# Patient Record
(demographics unavailable — no encounter records)

---

## 2025-03-20 NOTE — LETTER BODY
[Dear  ___] : Dear  [unfilled], [Courtesy Letter:] : I had the pleasure of seeing your patient, [unfilled], in my office today. [Please see my note below.] : Please see my note below. [Consult Closing:] : Thank you very much for allowing me to participate in the care of this patient.  If you have any questions, please do not hesitate to contact me. [FreeTextEntry3] : Best Regards,   Jess Mattson MD

## 2025-03-20 NOTE — HISTORY OF PRESENT ILLNESS
[FreeTextEntry1] : 48 YO M seen 4/5/2024 as NPT for kidney stones. He had his first and only episode of renal colic a few weeks ago with left renal colic. Went to [facility] ER and had a CT scan confirming stones. He subsequently passed 2 stones by history, did not capture them, and has been pain free. He is here for further evaluation.  Patient has MS and is on monthly infusions. NO other medicines and NKMA.  UA negative IPSS 8 MARCO ANTONIO 7 AGA 25 Renal US: Right kidney: 10.6 cm x 4.4 cm x 4.8 cm Cortical Thickness: 1.6 cm UP 1.3 cm LP Left kidney: 10.5 cm x 5.2 cm x 4.9 cm Cortical Thickness: 1.8 cm UP 1.3 cm LP Echogenicity: Normal Bladder: Not scanned Impressions: Right Kidney-There are 3 echogenic foci in the right mid to lower pole measuring 3.5 mm, 3.2 mm and 4.4 mm, all with faint shadowing and no twinkle artifact. Left Kidney-The left kidney is unremarkable. Both kidneys are normal in size and echogenicity without hydronephrosis or solid masses present. CT scan from ER 2mm left UVJ stone with hydroureteronephrosis, 3 mm right renal stone not obstructing. BUN18. CRE 1.36 in ER I reviewed the results of the CT scan blood work from the ER and ultrasound from our office today with the patient in detail we identified the resolution of his hydroureteronephrosis found on CT scan, by ultrasound today. We also confirmed at least 1 stone in the right kidney measuring between 3 and 4-1/2 mm by CT and ultrasound evaluation respectively. This is asymptomatic and non-obstructing at the present time. I recommended that we proceed with repeat BMP which was drawn today and KUB x-ray which he will have. We will meet after that to determine what and if the stone on the in the right kidney requires. In the meanwhile, I encouraged him to increase his H2O intake.  BMP: BUN 17, DIGNA 1,21 KUB X-ray 411/2024: Findings/ impression: Each kidney is obscured partially. Right kidney 3 mm calculus. No abnormal bowel dilatation or pneumoperitoneum. Levoscoliosis. Colonic and rectal feces/impaction.  Patient seen 4/12/2024 to review X-ray report and plan next steps. KUB X-ray from 4/11/2024 reviewed in PACS, 3 mm stone seen in the right lower pole, significant stool and gas in the colon, obscuring part of the right kidney.  UA - unable to leave a specimen today. We discussed findings on KUB x-ray today in comparison to his recent renal ultrasound performed in my office last week. These findings are most likely due to a small 3 mm right lower pole stone which is nonobstructing. I discussed with the patient the need to increase his H2O intake in order to prevent growth of the stone and we discussed some maneuvers to aid in the passage of the stone. We will reassess in 3 months with a repeat renal ultrasound in the office. As long as he remains pain-free and the stone does not grow, we will continue to observe.   Patient cancelled 7/11/2024 to reassess with Renal US. Staff to reschedule.   2/26/2025 Patient seen in the ER on 2/25/2025. Had a CT scan which showed a 3 mm UVJ stone. Phoned patient, he is pain free, but has not captured any stones. Instructed to keep looking and my staff will schedule FU in 2 weeks or so for Renal US and FU.  Patient seen TODAY 3/20/2025 to reassess with renal US. He has not had renal colic since the ER visit, did not capture any stones.  He continues to drink a significant amount of water. UA negative IPSS 10 RENAL US: Right kidney: 9.6 x 5.1 x 5.3 cm Cortical Thickness: 1.2 cm UP and 1.1 cm LP Left kidney: 10.1 x 6.3 x 4.6 cm Cortical Thickness: 1.4 cm UP and 1.4 cm LP Echogenicity: Normal Findings: Right Kidney: There is an echogenic focus in the mid to lower pole measuring 5.1 mm with twinkle artifact. There is a nonvascular sub centimeter cyst in the mid pole measuring 7.1 x 7.0 x 6.5 mm. Left Kidney: Appears unremarkable. Both kidneys are normal in size and echogenicity without hydronephrosis or solid masses present.  PELVIC US: Bladder: There is a nonmobile echogenic focus in the left UVJ measuring 7.1 mm with twinkle artifact. PVR: 15.3 cc Prostate 26gm

## 2025-03-20 NOTE — PHYSICAL EXAM
[General Appearance - In No Acute Distress] : no acute distress [Costovertebral Angle Tenderness] : no ~M costovertebral angle tenderness [Oriented To Time, Place, And Person] : oriented to person, place, and time [Affect] : the affect was normal [Mood] : the mood was normal [Not Anxious] : not anxious

## 2025-03-20 NOTE — ASSESSMENT
[FreeTextEntry1] : Findings today are consistent with a persistent left ureterovesical junction stone not presently obstructing.  I reviewed these findings with the patient today contrasting the CT scan views from the ER dated 2/25/2025 with the ultrasound views from the ultrasound of today.  Of the several findings noted today including the right renal stone and the cyst on the right kidney, these are not acute at this time.  We discussed the persistent left you the J stone as having the potential of up obstructing the left kidney causing either renal colic as he had on 25 February or causing silent obstruction with subsequent damage to the kidney over time.  I recommended he consider either close follow-up with interval renal ultrasound and pelvic ultrasound in 6 weeks or evaluation for definitive treatment of the left UVJ stone now.  We discussed the indications risks alternatives and chances for success with each of these approaches and he would like to proceed with evaluation and/or treatment as appropriate of the stone now.  I agree with this decision.  To this end, appointment was made to see my partner Dr. Arturo Yin for further assessment and treatment of this patient's stone disease. Jess Mattson MD

## 2025-03-20 NOTE — HISTORY OF PRESENT ILLNESS
[FreeTextEntry1] : 50 YO M seen 4/5/2024 as NPT for kidney stones. He had his first and only episode of renal colic a few weeks ago with left renal colic. Went to [facility] ER and had a CT scan confirming stones. He subsequently passed 2 stones by history, did not capture them, and has been pain free. He is here for further evaluation.  Patient has MS and is on monthly infusions. NO other medicines and NKMA.  UA negative IPSS 8 MARCO ANTONIO 7 AGA 25 Renal US: Right kidney: 10.6 cm x 4.4 cm x 4.8 cm Cortical Thickness: 1.6 cm UP 1.3 cm LP Left kidney: 10.5 cm x 5.2 cm x 4.9 cm Cortical Thickness: 1.8 cm UP 1.3 cm LP Echogenicity: Normal Bladder: Not scanned Impressions: Right Kidney-There are 3 echogenic foci in the right mid to lower pole measuring 3.5 mm, 3.2 mm and 4.4 mm, all with faint shadowing and no twinkle artifact. Left Kidney-The left kidney is unremarkable. Both kidneys are normal in size and echogenicity without hydronephrosis or solid masses present. CT scan from ER 2mm left UVJ stone with hydroureteronephrosis, 3 mm right renal stone not obstructing. BUN18. CRE 1.36 in ER I reviewed the results of the CT scan blood work from the ER and ultrasound from our office today with the patient in detail we identified the resolution of his hydroureteronephrosis found on CT scan, by ultrasound today. We also confirmed at least 1 stone in the right kidney measuring between 3 and 4-1/2 mm by CT and ultrasound evaluation respectively. This is asymptomatic and non-obstructing at the present time. I recommended that we proceed with repeat BMP which was drawn today and KUB x-ray which he will have. We will meet after that to determine what and if the stone on the in the right kidney requires. In the meanwhile, I encouraged him to increase his H2O intake.  BMP: BUN 17, DIGNA 1,21 KUB X-ray 411/2024: Findings/ impression: Each kidney is obscured partially. Right kidney 3 mm calculus. No abnormal bowel dilatation or pneumoperitoneum. Levoscoliosis. Colonic and rectal feces/impaction.  Patient seen 4/12/2024 to review X-ray report and plan next steps. KUB X-ray from 4/11/2024 reviewed in PACS, 3 mm stone seen in the right lower pole, significant stool and gas in the colon, obscuring part of the right kidney.  UA - unable to leave a specimen today. We discussed findings on KUB x-ray today in comparison to his recent renal ultrasound performed in my office last week. These findings are most likely due to a small 3 mm right lower pole stone which is nonobstructing. I discussed with the patient the need to increase his H2O intake in order to prevent growth of the stone and we discussed some maneuvers to aid in the passage of the stone. We will reassess in 3 months with a repeat renal ultrasound in the office. As long as he remains pain-free and the stone does not grow, we will continue to observe.   Patient cancelled 7/11/2024 to reassess with Renal US. Staff to reschedule.   2/26/2025 Patient seen in the ER on 2/25/2025. Had a CT scan which showed a 3 mm UVJ stone. Phoned patient, he is pain free, but has not captured any stones. Instructed to keep looking and my staff will schedule FU in 2 weeks or so for Renal US and FU.  Patient seen TODAY 3/20/2025 to reassess with renal US. He has not had renal colic since the ER visit, did not capture any stones.  He continues to drink a significant amount of water. UA negative IPSS 10 RENAL US: Right kidney: 9.6 x 5.1 x 5.3 cm Cortical Thickness: 1.2 cm UP and 1.1 cm LP Left kidney: 10.1 x 6.3 x 4.6 cm Cortical Thickness: 1.4 cm UP and 1.4 cm LP Echogenicity: Normal Findings: Right Kidney: There is an echogenic focus in the mid to lower pole measuring 5.1 mm with twinkle artifact. There is a nonvascular sub centimeter cyst in the mid pole measuring 7.1 x 7.0 x 6.5 mm. Left Kidney: Appears unremarkable. Both kidneys are normal in size and echogenicity without hydronephrosis or solid masses present.  PELVIC US: Bladder: There is a nonmobile echogenic focus in the left UVJ measuring 7.1 mm with twinkle artifact. PVR: 15.3 cc Prostate 26gm

## 2025-03-29 NOTE — HISTORY OF PRESENT ILLNESS
[FreeTextEntry1] : Name SHARON VIDALES MRN [id number]  May 22 1975 ------------------------------------------------------------------------------------------------------------------------------------------- Date of First Visit: 3/28/25 Referring Provider/PCP: Dr. Mattson / Dr. Johnathan Johnson ------------------------------------------------------------------------------------------------------------------------------------------- CC: kidney stone   History of Present Illness: SHARON VIDALES is a 49-year-old M who presents for evaluation of kidney stones. He was first seen by Dr. Mattson in , at which time he had had his first ever episode of renal colic (left-sided). He went to [facility] and had a CT scan confirming stones. He subsequently passed 2 stones by history, did not capture them. CT from  in ED showed a 3 mm left UVJ stone. Renal US in the office with Dr. Mattson on 3/20 showed persistent left UVJ stone. He has been asymptomatic.   Imaging:  CT scan 2025. Findings: 3 mm left UVJ calculus with mild left-sided hydroureteronephrosis.    Kidney Stone History: First-time stone former - No Concurrent asymptomatic stone(s) - Yes Previous stone surgeries - No Previous passed stones - Yes  Comorbidities - non-contributory

## 2025-03-29 NOTE — ASSESSMENT
[FreeTextEntry1] : Assessment: SHARON VIDALES is a 49 year old M with a 3 mm left UVJ stone that has failed to pass after 1 month. Currently asymptomatic.    I discussed the management of urolithiasis with the patient:   Watchful Waiting +/- Medical Expulsive Therapy (MET): The likelihood of passage goes down with increased stone size and more proximal location. However, I explained that there is always a risk that the stone could become more symptomatic (pain, infection) as it passes or not pass at all, which would require surgical treatment. Moreover, the chances he passes the stone at this point, now nearly 6 weeks following diagnosis, is <2%.    Ureteroscopy: I explained the technique in detail and how it is performed. Very commonly, a ureteral stent is left in place at the conclusion of the procedure, but only if needed. I explained that if a stent is placed, it would need to be removed either cystoscopically under local anesthesia or it may have a string left externally through the urethra for removal in a few days after the procedure. Risks of ureteroscopy include, but are not limited to, bleeding, infection, injury to the bladder or ureter, ureteral perforation, ureteral stricture, residual fragments leading to subsequent symptoms or secondary procedures, and other risks involved with general anesthesia. There is also the risk that the procedure needs to be staged into more than one session based on the patient's internal anatomy and the size of the stone(s). Finally, dilation of the ureter and/or ureteral stent placement prior to definitive ureteroscopy may be necessary to achieve ureteral access safely in up to 5% of patients, particularly those who have not been previously instrumented.   I have answered all the patient's questions and he has elected to undergo a ureteroscopy in the event the stone does not pass before then. He will strain urine in the interim. Given complexities of his work schedule, we discussed tentatively scheduling for left ureteroscopy with laser lithotripsy 4/22 with an office follow up 4/17 or 4/18 for US to r/o interval passage.   Plan: -Patient will call/email with decision on how he prefers to proceed -Pre-operative labs, UA/UCx -Medical clearance requested

## 2025-03-29 NOTE — HISTORY OF PRESENT ILLNESS
[FreeTextEntry1] : Name SHARON VIDALES MRN [id number]  May 22 1975 ------------------------------------------------------------------------------------------------------------------------------------------- Date of First Visit: 3/28/25 Referring Provider/PCP: Dr. Mattson / Dr. Johnathan Johnson ------------------------------------------------------------------------------------------------------------------------------------------- CC: kidney stone   History of Present Illness: SHARON VIDALES is a 49-year-old M who presents for evaluation of kidney stones. He was first seen by Dr. Mattson in , at which time he had had his first ever episode of renal colic (left-sided). He went to [facility] and had a CT scan confirming stones. He subsequently passed 2 stones by history, did not capture them. CT from  in ED showed a 3 mm left UVJ stone. Renal US in the office with Dr. Mattson on 3/20 showed persistent left UVJ stone. He has been asymptomatic.   Imaging:  CT scan 2025. Findings: 3 mm left UVJ calculus with mild left-sided hydroureteronephrosis.    Kidney Stone History: First-time stone former - No Concurrent asymptomatic stone(s) - Yes Previous stone surgeries - No Previous passed stones - Yes  Comorbidities - non-contributory

## 2025-03-29 NOTE — HISTORY OF PRESENT ILLNESS
[FreeTextEntry1] : Name SHARON VIDALES MRN [id number]  May 22 1975 ------------------------------------------------------------------------------------------------------------------------------------------- Date of First Visit: 3/28/25 Referring Provider/PCP: Dr. Mattson / Dr. Johnathan Johnson ------------------------------------------------------------------------------------------------------------------------------------------- CC: kidney stone   History of Present Illness: SHARON VIDALES is a 49-year-old M who presents for evaluation of kidney stones. He was first seen by Dr. Mattson in , at which time he had had his first ever episode of renal colic (left-sided). He went to [facility] and had a CT scan confirming stones. He subsequently passed 2 stones by history, did not capture them. CT from  in ED showed a 3 mm left UVJ stone. Renal US in the office with Dr. Mattson on 3/20 showed persistent left UVJ stone. He has been asymptomatic.   Imaging:  CT scan 2025. Findings: 3 mm left UVJ calculus with mild left-sided hydroureteronephrosis.    Kidney Stone History: First-time stone former - No Concurrent asymptomatic stone(s) - Yes Previous stone surgeries - No Previous passed stones - Yes  Comorbidities - non-contributory

## 2025-06-19 NOTE — ASSESSMENT
[FreeTextEntry1] : Assessment: SHARON VIDALES is a 49 year old M with a 3 mm left UVJ stone that passed after 3 months based on US today. . Currently asymptomatic. Small right LP non-obstructing stone.   Plan: -Will cancel upcoming right ureteroscopy/laser lithotripsy  -[facility] -TEB in 2 months -Next US 1 year

## 2025-06-19 NOTE — HISTORY OF PRESENT ILLNESS
[FreeTextEntry1] : Name SHARON VIDALES MRN [id number]  May 22 1975 ------------------------------------------------------------------------------------------------------------------------------------------- Date of First Visit: 3/28/25 Referring Provider/PCP: Dr. Mattson / Dr. Johnathan Johnson ------------------------------------------------------------------------------------------------------------------------------------------- CC: kidney stone  History of Present Illness: SHARON VIDALES is a 49-year-old M who presents for evaluation of kidney stones. He was first seen by Dr. Mattson in , at which time he had had his first ever episode of renal colic (left-sided). He went to [facility] and had a CT scan confirming stones. He subsequently passed 2 stones by history, did not capture them. CT from  in ED showed a 3 mm left UVJ stone. Renal US in the office with Dr. Mattson on 3/20 showed persistent left UVJ stone. He has been asymptomatic.  Imaging: CT scan 2025. Findings: 3 mm left UVJ calculus with mild left-sided hydroureteronephrosis.  Kidney Stone History: First-time stone former - No Concurrent asymptomatic stone(s) - Yes Previous stone surgeries - No Previous passed stones - Yes Comorbidities - non-contributory ------------------------------------------------------------------------------------------------------------------------------------------- Interval History (2025): Yet to see stone in his urine. US today shows no e/o stone at the UVJs with strong ureteral jets. No hydro bilaterally. Single echogenic focus - RLP 5.5 mm. Known 5 mm stone on CT (~2-3 mm on my personal measurement).